# Patient Record
Sex: MALE
[De-identification: names, ages, dates, MRNs, and addresses within clinical notes are randomized per-mention and may not be internally consistent; named-entity substitution may affect disease eponyms.]

---

## 2019-02-27 PROBLEM — Z00.00 ENCOUNTER FOR PREVENTIVE HEALTH EXAMINATION: Status: ACTIVE | Noted: 2019-02-27

## 2019-05-14 ENCOUNTER — APPOINTMENT (OUTPATIENT)
Dept: OTOLARYNGOLOGY | Facility: CLINIC | Age: 84
End: 2019-05-14
Payer: MEDICARE

## 2019-05-14 ENCOUNTER — APPOINTMENT (OUTPATIENT)
Dept: OTOLARYNGOLOGY | Facility: CLINIC | Age: 84
End: 2019-05-14

## 2019-05-14 VITALS
DIASTOLIC BLOOD PRESSURE: 73 MMHG | BODY MASS INDEX: 25.39 KG/M2 | HEART RATE: 79 BPM | HEIGHT: 66 IN | SYSTOLIC BLOOD PRESSURE: 130 MMHG | WEIGHT: 158 LBS

## 2019-05-14 DIAGNOSIS — E78.5 HYPERLIPIDEMIA, UNSPECIFIED: ICD-10-CM

## 2019-05-14 DIAGNOSIS — K85.90 ACUTE PANCREATITIS WITHOUT NECROSIS OR INFECTION, UNSPECIFIED: ICD-10-CM

## 2019-05-14 DIAGNOSIS — H92.01 OTALGIA, RIGHT EAR: ICD-10-CM

## 2019-05-14 DIAGNOSIS — H91.90 UNSPECIFIED HEARING LOSS, UNSPECIFIED EAR: ICD-10-CM

## 2019-05-14 DIAGNOSIS — Z82.49 FAMILY HISTORY OF ISCHEMIC HEART DISEASE AND OTHER DISEASES OF THE CIRCULATORY SYSTEM: ICD-10-CM

## 2019-05-14 DIAGNOSIS — I10 ESSENTIAL (PRIMARY) HYPERTENSION: ICD-10-CM

## 2019-05-14 DIAGNOSIS — M19.90 UNSPECIFIED OSTEOARTHRITIS, UNSPECIFIED SITE: ICD-10-CM

## 2019-05-14 DIAGNOSIS — J44.9 CHRONIC OBSTRUCTIVE PULMONARY DISEASE, UNSPECIFIED: ICD-10-CM

## 2019-05-14 PROCEDURE — 92567 TYMPANOMETRY: CPT

## 2019-05-14 PROCEDURE — 92557 COMPREHENSIVE HEARING TEST: CPT

## 2019-05-14 PROCEDURE — 69210 REMOVE IMPACTED EAR WAX UNI: CPT

## 2019-05-14 PROCEDURE — 99213 OFFICE O/P EST LOW 20 MIN: CPT | Mod: 25

## 2019-05-14 RX ORDER — LOSARTAN POTASSIUM 100 MG/1
TABLET, FILM COATED ORAL
Refills: 0 | Status: ACTIVE | COMMUNITY

## 2019-05-14 RX ORDER — MONTELUKAST SODIUM 10 MG/1
TABLET, FILM COATED ORAL
Refills: 0 | Status: ACTIVE | COMMUNITY

## 2019-05-14 RX ORDER — CALCIUM CITRATE/VITAMIN D3 200MG-6.25
TABLET ORAL
Refills: 0 | Status: ACTIVE | COMMUNITY

## 2019-05-14 RX ORDER — BUDESONIDE AND FORMOTEROL FUMARATE DIHYDRATE 160; 4.5 UG/1; UG/1
AEROSOL RESPIRATORY (INHALATION)
Refills: 0 | Status: ACTIVE | COMMUNITY

## 2019-05-14 RX ORDER — ALFUZOSIN HYDROCHLORIDE 10 MG/1
10 TABLET, EXTENDED RELEASE ORAL
Refills: 0 | Status: ACTIVE | COMMUNITY

## 2019-05-14 RX ORDER — LIPASE/PROTEASE/AMYLASE 124 MG
CAPSULE,DELAYED RELEASE (ENTERIC COATED) ORAL
Refills: 0 | Status: ACTIVE | COMMUNITY

## 2019-05-14 RX ORDER — SIMVASTATIN 80 MG/1
TABLET, FILM COATED ORAL
Refills: 0 | Status: ACTIVE | COMMUNITY

## 2019-05-14 RX ORDER — FAMOTIDINE 10 MG/1
TABLET, FILM COATED ORAL
Refills: 0 | Status: ACTIVE | COMMUNITY

## 2019-05-14 RX ORDER — AZELASTINE HYDROCHLORIDE AND FLUTICASONE PROPIONATE 137; 50 UG/1; UG/1
SPRAY, METERED NASAL
Refills: 0 | Status: ACTIVE | COMMUNITY

## 2019-05-14 NOTE — ASSESSMENT
[FreeTextEntry1] : RACHAEL CALVO had cerumen stuck between TM and bony overhang. Hopefully this will alleviate his dull ear pain. I asked to le t me know. He has presbycusis and will have his aids adjusted.

## 2019-05-14 NOTE — REVIEW OF SYSTEMS
[Post Nasal Drip] : post nasal drip [Sneezing] : sneezing [Seasonal Allergies] : seasonal allergies [Ear Pain] : ear pain [Ear Itch] : ear itch [Nasal Congestion] : nasal congestion [Hearing Loss] : hearing loss [Recurrent Ear Infections] : recurrent ear infections [Recurrent Sinus Infections] : recurrent sinus infections [Problem Snoring] : problem snoring [Throat Dryness] : throat dryness [Feeling Tired] : feeling tired [Chills] : chills [Feeling Poorly] : feeling poorly [Wheezing] : wheezing [Shortness Of Breath] : shortness of breath [Eyesight Problems] : eyesight problems [Constipation] : constipation [SOB on Exertion] : shortness of breath during exertion [Joint Pain] : joint pain [Patient Intake Form Reviewed] : Patient intake form was reviewed

## 2019-05-14 NOTE — HISTORY OF PRESENT ILLNESS
[de-identified] : RACHAEL CALVO is a 84 year man with a history of presbycusis who uses hearing aids. He has several weeks of dull intermittent right ear ache.\par \par

## 2019-05-14 NOTE — REASON FOR VISIT
[Hearing Loss] : hearing loss [Subsequent Evaluation] : a subsequent evaluation for [FreeTextEntry2] : wax removal

## 2019-08-15 ENCOUNTER — APPOINTMENT (OUTPATIENT)
Dept: OTOLARYNGOLOGY | Facility: CLINIC | Age: 84
End: 2019-08-15

## 2019-08-22 ENCOUNTER — APPOINTMENT (OUTPATIENT)
Dept: OTOLARYNGOLOGY | Facility: CLINIC | Age: 84
End: 2019-08-22
Payer: MEDICARE

## 2019-08-22 DIAGNOSIS — H61.21 IMPACTED CERUMEN, RIGHT EAR: ICD-10-CM

## 2019-08-22 PROCEDURE — 99213 OFFICE O/P EST LOW 20 MIN: CPT | Mod: 25

## 2019-08-22 PROCEDURE — 69210 REMOVE IMPACTED EAR WAX UNI: CPT

## 2019-08-22 NOTE — ASSESSMENT
[FreeTextEntry1] : Cerumen impaction was removed from the right ear\par \par PLAN\par \par -findings and management options discussed in detail with the patient. \par -good aural hygiene\par -annual audiogram. he was last tested in May\par -follow up with the audiologist as needed to have his hearing aids checked. \par -follow up with Dr. Chamorro as scheduled in November to check his ears. \par -call and return earlier if any concerns. \par

## 2019-08-22 NOTE — HISTORY OF PRESENT ILLNESS
[de-identified] : RACHAEL CALVO is a 84 year patient with a history of bilateral sensorineural hearing loss. He uses hearing aids. He is here for cerumen removal. He has itchy ears but no otalgia or otorrhea

## 2019-11-19 ENCOUNTER — RX RENEWAL (OUTPATIENT)
Age: 84
End: 2019-11-19

## 2019-11-21 ENCOUNTER — APPOINTMENT (OUTPATIENT)
Dept: OTOLARYNGOLOGY | Facility: CLINIC | Age: 84
End: 2019-11-21
Payer: MEDICARE

## 2019-11-21 VITALS
DIASTOLIC BLOOD PRESSURE: 74 MMHG | WEIGHT: 158 LBS | HEIGHT: 66 IN | SYSTOLIC BLOOD PRESSURE: 129 MMHG | HEART RATE: 80 BPM | BODY MASS INDEX: 25.39 KG/M2

## 2019-11-21 DIAGNOSIS — J34.9 UNSPECIFIED DISORDER OF NOSE AND NASAL SINUSES: ICD-10-CM

## 2019-11-21 PROCEDURE — 31231 NASAL ENDOSCOPY DX: CPT

## 2019-11-21 PROCEDURE — 99213 OFFICE O/P EST LOW 20 MIN: CPT | Mod: 25

## 2019-11-21 NOTE — REVIEW OF SYSTEMS
[Post Nasal Drip] : post nasal drip [Dizziness] : dizziness [Lightheadedness] : lightheadedness [Nasal Congestion] : nasal congestion [Nose Bleeds] : nose bleeds [Recurrent Sinus Infections] : recurrent sinus infections [Problem Snoring] : problem snoring [Sinus Pressure] : sinus pressure [Sense Of Smell Problem] : sense of smell problem [Discolored Nasal Discharge] : discolored nasal discharge [Hoarseness] : hoarseness [Feeling Tired] : feeling tired [Shortness Of Breath] : shortness of breath [Cough] : cough [Patient Intake Form Reviewed] : Patient intake form was reviewed

## 2019-11-21 NOTE — HISTORY OF PRESENT ILLNESS
[de-identified] : RACHAEL CALVO is a 84 year man with a history of CRS who recently returned from [facility]. He got sick about one week ago. He complains of discolored mucus from his nose, and stuffiness. he has mild cough. He is on day 4 of Z TEENA and is feeling a bit better but still has drainage.

## 2019-12-03 LAB — BACTERIA FLD CULT: NORMAL

## 2019-12-17 ENCOUNTER — APPOINTMENT (OUTPATIENT)
Dept: OTOLARYNGOLOGY | Facility: CLINIC | Age: 84
End: 2019-12-17
Payer: MEDICARE

## 2019-12-17 PROCEDURE — 99211 OFF/OP EST MAY X REQ PHY/QHP: CPT

## 2019-12-19 ENCOUNTER — APPOINTMENT (OUTPATIENT)
Dept: OTOLARYNGOLOGY | Facility: CLINIC | Age: 84
End: 2019-12-19
Payer: SELF-PAY

## 2019-12-19 PROCEDURE — V5010 ASSESSMENT FOR HEARING AID: CPT | Mod: NC

## 2019-12-27 ENCOUNTER — RX RENEWAL (OUTPATIENT)
Age: 84
End: 2019-12-27

## 2019-12-27 ENCOUNTER — APPOINTMENT (OUTPATIENT)
Dept: OTOLARYNGOLOGY | Facility: CLINIC | Age: 84
End: 2019-12-27
Payer: SELF-PAY

## 2019-12-27 PROCEDURE — V5011: CPT | Mod: NC

## 2019-12-27 PROCEDURE — V5261I: CUSTOM

## 2019-12-27 NOTE — HISTORY OF PRESENT ILLNESS
[de-identified] : Well-known to the practice.\par Reports that his hearing aid dome got caught in his ear yesterday. He is with minimal discomfort.\par \par The foreign body hearing aide dome was removed. His eardrum looked fine.\par No further workup or treatment required.\par

## 2020-01-10 ENCOUNTER — APPOINTMENT (OUTPATIENT)
Dept: OTOLARYNGOLOGY | Facility: CLINIC | Age: 85
End: 2020-01-10
Payer: MEDICARE

## 2020-01-10 ENCOUNTER — APPOINTMENT (OUTPATIENT)
Dept: OTOLARYNGOLOGY | Facility: CLINIC | Age: 85
End: 2020-01-10
Payer: SELF-PAY

## 2020-01-10 VITALS
HEIGHT: 66 IN | WEIGHT: 158 LBS | DIASTOLIC BLOOD PRESSURE: 69 MMHG | BODY MASS INDEX: 25.39 KG/M2 | SYSTOLIC BLOOD PRESSURE: 129 MMHG | HEART RATE: 77 BPM

## 2020-01-10 DIAGNOSIS — L29.9 PRURITUS, UNSPECIFIED: ICD-10-CM

## 2020-01-10 PROCEDURE — 99213 OFFICE O/P EST LOW 20 MIN: CPT

## 2020-01-10 PROCEDURE — 92593: CPT | Mod: NC

## 2020-01-10 RX ORDER — AZITHROMYCIN 250 MG/1
250 TABLET, FILM COATED ORAL
Qty: 6 | Refills: 0 | Status: DISCONTINUED | COMMUNITY
Start: 2019-11-19 | End: 2020-01-10

## 2020-01-10 NOTE — HISTORY OF PRESENT ILLNESS
[de-identified] : RACHAEL CALVO is a 84 year man with a history of presbycusis who uses hearing aids.\par  and CRS who complains of very itchy right ear.

## 2020-01-10 NOTE — ASSESSMENT
[FreeTextEntry1] : RACHALE CALVO has mild dermatitis and will negrito his Dermotic oil bid for 4 days the prn.

## 2020-01-11 ENCOUNTER — RX RENEWAL (OUTPATIENT)
Age: 85
End: 2020-01-11

## 2020-04-06 ENCOUNTER — APPOINTMENT (OUTPATIENT)
Dept: OTOLARYNGOLOGY | Facility: CLINIC | Age: 85
End: 2020-04-06

## 2020-04-09 ENCOUNTER — APPOINTMENT (OUTPATIENT)
Dept: OTOLARYNGOLOGY | Facility: CLINIC | Age: 85
End: 2020-04-09

## 2020-09-10 ENCOUNTER — APPOINTMENT (OUTPATIENT)
Dept: OTOLARYNGOLOGY | Facility: CLINIC | Age: 85
End: 2020-09-10
Payer: MEDICARE

## 2020-09-10 ENCOUNTER — APPOINTMENT (OUTPATIENT)
Dept: OTOLARYNGOLOGY | Facility: CLINIC | Age: 85
End: 2020-09-10
Payer: SELF-PAY

## 2020-09-10 VITALS — HEIGHT: 66 IN | WEIGHT: 158 LBS | BODY MASS INDEX: 25.39 KG/M2 | TEMPERATURE: 98.7 F

## 2020-09-10 DIAGNOSIS — Z46.1 ENCOUNTER FOR FITTING AND ADJUSTMENT OF HEARING AID: ICD-10-CM

## 2020-09-10 PROCEDURE — 92593: CPT | Mod: NC

## 2020-09-10 PROCEDURE — 99213 OFFICE O/P EST LOW 20 MIN: CPT | Mod: 25

## 2020-09-10 PROCEDURE — 92557 COMPREHENSIVE HEARING TEST: CPT

## 2020-09-10 PROCEDURE — 92567 TYMPANOMETRY: CPT

## 2020-09-10 PROCEDURE — 69210 REMOVE IMPACTED EAR WAX UNI: CPT

## 2020-09-10 RX ORDER — TADALAFIL 5 MG/1
TABLET, FILM COATED ORAL
Refills: 0 | Status: ACTIVE | COMMUNITY

## 2020-09-10 RX ORDER — VITAMIN B COMPLEX
CAPSULE ORAL
Refills: 0 | Status: ACTIVE | COMMUNITY

## 2020-09-10 RX ORDER — BIFIDOBACTERIUM LONGUM 10MM CELL
CAPSULE ORAL
Refills: 0 | Status: DISCONTINUED | COMMUNITY
End: 2020-09-10

## 2020-09-10 RX ORDER — CALCIUM CITRATE/VITAMIN D3 200MG-6.25
TABLET ORAL
Refills: 0 | Status: ACTIVE | COMMUNITY

## 2020-09-10 RX ORDER — CALCIUM POLYCARBOPHIL 625 MG
TABLET ORAL
Refills: 0 | Status: ACTIVE | COMMUNITY

## 2020-09-10 RX ORDER — MULTIVITAMIN
TABLET ORAL
Refills: 0 | Status: ACTIVE | COMMUNITY

## 2020-09-10 NOTE — HISTORY OF PRESENT ILLNESS
[de-identified] : RACHAEL CALVO is a 85 year man with a history of presbycusis who uses hearing aids. He has a history of right sided sudden hearing loss years ago. he feels his hearing aIds aren't working well.\par

## 2020-09-10 NOTE — ASSESSMENT
[FreeTextEntry1] : RACHAEL CALVO has very poor word recognition on right side. His left aid is not working and will be repaired. He might benefit from biCROS hearing aids.

## 2021-01-26 ENCOUNTER — APPOINTMENT (OUTPATIENT)
Dept: OTOLARYNGOLOGY | Facility: CLINIC | Age: 86
End: 2021-01-26
Payer: SELF-PAY

## 2021-01-26 PROCEDURE — 92593: CPT | Mod: NC

## 2021-03-16 RX ORDER — AZELASTINE HYDROCHLORIDE AND FLUTICASONE PROPIONATE 137; 50 UG/1; UG/1
137-50 SPRAY, METERED NASAL
Qty: 1 | Refills: 3 | Status: ACTIVE | COMMUNITY
Start: 2021-03-16 | End: 1900-01-01

## 2021-05-14 ENCOUNTER — RX RENEWAL (OUTPATIENT)
Age: 86
End: 2021-05-14

## 2021-08-03 ENCOUNTER — APPOINTMENT (OUTPATIENT)
Dept: OTOLARYNGOLOGY | Facility: CLINIC | Age: 86
End: 2021-08-03
Payer: MEDICARE

## 2021-08-03 VITALS — HEIGHT: 66 IN | TEMPERATURE: 97.8 F | WEIGHT: 152 LBS | BODY MASS INDEX: 24.43 KG/M2

## 2021-08-03 DIAGNOSIS — H91.13 PRESBYCUSIS, BILATERAL: ICD-10-CM

## 2021-08-03 PROCEDURE — 92567 TYMPANOMETRY: CPT

## 2021-08-03 PROCEDURE — 92557 COMPREHENSIVE HEARING TEST: CPT

## 2021-08-03 PROCEDURE — G0268 REMOVAL OF IMPACTED WAX MD: CPT

## 2021-08-03 PROCEDURE — 99213 OFFICE O/P EST LOW 20 MIN: CPT | Mod: 25

## 2021-08-03 NOTE — REVIEW OF SYSTEMS
[Hearing Loss] : hearing loss [Negative] : Heme/Lymph [Ear Itch] : ear itch [Patient Intake Form Reviewed] : Patient intake form was reviewed

## 2021-08-03 NOTE — HISTORY OF PRESENT ILLNESS
[de-identified] : RACHAEL CALVO is a 86 year man with a history of sudden hearing loss AD many years ago. he lost his right hearing aids. He has been having a "shock" like feeling starting in his head and then traveling downward. he will see his neurologist Dr. Tolentino.

## 2021-08-03 NOTE — ASSESSMENT
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 he will get new hearing aids. I suggest he see his neurologist for his shock like feeling.

## 2021-10-03 ENCOUNTER — RX RENEWAL (OUTPATIENT)
Age: 86
End: 2021-10-03

## 2021-10-03 RX ORDER — FLUOCINOLONE ACETONIDE 0.11 MG/ML
0.01 OIL AURICULAR (OTIC)
Qty: 20 | Refills: 0 | Status: ACTIVE | COMMUNITY
Start: 2019-12-27 | End: 1900-01-01

## 2022-03-01 RX ORDER — PREDNISONE 20 MG/1
20 TABLET ORAL
Qty: 4 | Refills: 0 | Status: ACTIVE | COMMUNITY
Start: 2022-03-01 | End: 1900-01-01

## 2023-08-09 ENCOUNTER — APPOINTMENT (OUTPATIENT)
Dept: OTOLARYNGOLOGY | Facility: CLINIC | Age: 88
End: 2023-08-09
Payer: MEDICARE

## 2023-08-09 VITALS — WEIGHT: 152 LBS | HEIGHT: 66 IN | BODY MASS INDEX: 24.43 KG/M2

## 2023-08-09 DIAGNOSIS — H61.23 IMPACTED CERUMEN, BILATERAL: ICD-10-CM

## 2023-08-09 DIAGNOSIS — H90.3 SENSORINEURAL HEARING LOSS, BILATERAL: ICD-10-CM

## 2023-08-09 PROCEDURE — 99213 OFFICE O/P EST LOW 20 MIN: CPT | Mod: 25

## 2023-08-09 PROCEDURE — 92557 COMPREHENSIVE HEARING TEST: CPT

## 2023-08-09 PROCEDURE — 92567 TYMPANOMETRY: CPT

## 2023-08-09 PROCEDURE — 69210 REMOVE IMPACTED EAR WAX UNI: CPT

## 2023-08-09 NOTE — HISTORY OF PRESENT ILLNESS
[de-identified] : RACHAEL CALVO is a 88 year man with a history o presbycusis who uses hearing aids. He recently had transient bilateral hearing loss. His ears feel better.

## 2023-08-09 NOTE — PHYSICAL EXAM
[TextEntry] : PHYSICAL EXAM  General: The patient was alert and oriented and in no distress. Voice was normal.  Neurologic: Cranial Nerves II-XII intact PERRLA There was no significant nystagmus or disconjugate gaze noted.  EOM's: Normal  Face: The patient had no facial asymmetry or mass. The skin was unremarkable.  Ears: External ears were normal without deformity. Ear canals were normal. Tympanic membranes were intact and normal. No perforation or effusion I removed impacted cerumen ( and wax filter from AD).from both ears under the microscope with curet, forceps, irrigation  and suction  Nose:  The external nose had no significant deformity.  There was no facial tenderness.  On anterior rhinoscopy, the nasal mucosa was normal.  The anterior septum was normal. There were no visualized polyps purulence  or masses.  Oral cavity: The oral mucosa was normal. The oral and base of tongue were clear and without mass. The gingival and buccal mucosa were moist and without lesions. The palate moved well. There was no cleft palate. There appeared to be good salivary flow.   There was no pus, erythema or mass in the oral cavity/oropharynx.  Neck:  The neck was symmetrical. The parotid and submandibular glands were normal without masses. The trachea was midline and there was no unusual crepitus. Thyroid was smooth and nontender and no masses were palpated. Cervical adenopathy- none.

## 2023-08-09 NOTE — ASSESSMENT
[FreeTextEntry1] : RACHAEL MILDREDMA had cerumen and fb removed. I suggested audiogram and he will schedule.

## 2023-08-10 RX ORDER — PREDNISONE 10 MG/1
10 TABLET ORAL
Qty: 15 | Refills: 0 | Status: ACTIVE | COMMUNITY
Start: 2023-08-10 | End: 1900-01-01

## 2023-08-14 PROBLEM — H90.3 SENSORINEURAL HEARING LOSS (SNHL) OF BOTH EARS: Status: ACTIVE | Noted: 2019-05-14
